# Patient Record
Sex: FEMALE | Race: WHITE | NOT HISPANIC OR LATINO | URBAN - METROPOLITAN AREA
[De-identification: names, ages, dates, MRNs, and addresses within clinical notes are randomized per-mention and may not be internally consistent; named-entity substitution may affect disease eponyms.]

---

## 2017-05-02 ENCOUNTER — APPOINTMENT (RX ONLY)
Dept: URBAN - METROPOLITAN AREA CLINIC 71 | Facility: CLINIC | Age: 72
Setting detail: DERMATOLOGY
End: 2017-05-02

## 2017-05-02 ENCOUNTER — APPOINTMENT (RX ONLY)
Dept: URBAN - METROPOLITAN AREA CLINIC 70 | Facility: CLINIC | Age: 72
Setting detail: DERMATOLOGY
End: 2017-05-02

## 2017-05-02 DIAGNOSIS — L29.89 OTHER PRURITUS: ICD-10-CM | Status: STABLE

## 2017-05-02 DIAGNOSIS — L29.8 OTHER PRURITUS: ICD-10-CM | Status: STABLE

## 2017-05-02 DIAGNOSIS — L56.8 OTHER SPECIFIED ACUTE SKIN CHANGES DUE TO ULTRAVIOLET RADIATION: ICD-10-CM

## 2017-05-02 DIAGNOSIS — L50.8 OTHER URTICARIA: ICD-10-CM

## 2017-05-02 DIAGNOSIS — L21.8 OTHER SEBORRHEIC DERMATITIS: ICD-10-CM

## 2017-05-02 DIAGNOSIS — Z71.89 OTHER SPECIFIED COUNSELING: ICD-10-CM

## 2017-05-02 PROCEDURE — 99214 OFFICE O/P EST MOD 30 MIN: CPT

## 2017-05-02 PROCEDURE — ? COUNSELING

## 2017-05-02 PROCEDURE — ? TREATMENT REGIMEN

## 2017-05-02 PROCEDURE — ? PRESCRIPTION

## 2017-05-02 RX ORDER — FLUOCINONIDE 0.5 MG/ML
OINTMENT TOPICAL
Qty: 1 | Refills: 0 | Status: ERX | COMMUNITY
Start: 2017-05-02

## 2017-05-02 RX ORDER — TRIAMCINOLONE ACETONIDE 1 MG/G
OINTMENT TOPICAL
Qty: 1 | Refills: 0 | Status: ERX

## 2017-05-02 RX ADMIN — FLUOCINONIDE: 0.5 OINTMENT TOPICAL at 00:00

## 2017-05-02 ASSESSMENT — LOCATION ZONE DERM
LOCATION ZONE: FACE
LOCATION ZONE: HAND
LOCATION ZONE: HAND
LOCATION ZONE: FACE
LOCATION ZONE: TRUNK
LOCATION ZONE: ARM
LOCATION ZONE: TRUNK
LOCATION ZONE: ARM

## 2017-05-02 ASSESSMENT — LOCATION DETAILED DESCRIPTION DERM
LOCATION DETAILED: LEFT MEDIAL BREAST 11-12:00 REGION
LOCATION DETAILED: RIGHT INFERIOR MEDIAL FOREHEAD
LOCATION DETAILED: INFERIOR THORACIC SPINE
LOCATION DETAILED: LEFT INFERIOR MEDIAL FOREHEAD
LOCATION DETAILED: LEFT INFERIOR MEDIAL FOREHEAD
LOCATION DETAILED: LEFT MEDIAL BREAST 11-12:00 REGION
LOCATION DETAILED: RIGHT POSTERIOR SHOULDER
LOCATION DETAILED: LEFT MID-UPPER BACK
LOCATION DETAILED: LEFT LATERAL SUPERIOR CHEST
LOCATION DETAILED: RIGHT RADIAL DORSAL HAND
LOCATION DETAILED: LEFT ULNAR DORSAL HAND
LOCATION DETAILED: LEFT SUPERIOR MEDIAL BUCCAL CHEEK
LOCATION DETAILED: LEFT CENTRAL MALAR CHEEK
LOCATION DETAILED: LEFT SUPERIOR MEDIAL BUCCAL CHEEK
LOCATION DETAILED: RIGHT INFERIOR MEDIAL FOREHEAD
LOCATION DETAILED: INFERIOR THORACIC SPINE
LOCATION DETAILED: RIGHT RADIAL DORSAL HAND
LOCATION DETAILED: LEFT CENTRAL MALAR CHEEK
LOCATION DETAILED: LEFT LATERAL SUPERIOR CHEST
LOCATION DETAILED: LEFT MID-UPPER BACK
LOCATION DETAILED: RIGHT POSTERIOR SHOULDER
LOCATION DETAILED: LEFT ULNAR DORSAL HAND

## 2017-05-02 ASSESSMENT — LOCATION SIMPLE DESCRIPTION DERM
LOCATION SIMPLE: LEFT CHEEK
LOCATION SIMPLE: RIGHT HAND
LOCATION SIMPLE: LEFT UPPER BACK
LOCATION SIMPLE: RIGHT HAND
LOCATION SIMPLE: CHEST
LOCATION SIMPLE: LEFT FOREHEAD
LOCATION SIMPLE: LEFT HAND
LOCATION SIMPLE: LEFT UPPER BACK
LOCATION SIMPLE: RIGHT SHOULDER
LOCATION SIMPLE: LEFT BREAST
LOCATION SIMPLE: RIGHT SHOULDER
LOCATION SIMPLE: RIGHT FOREHEAD
LOCATION SIMPLE: LEFT FOREHEAD
LOCATION SIMPLE: LEFT CHEEK
LOCATION SIMPLE: CHEST
LOCATION SIMPLE: LEFT HAND
LOCATION SIMPLE: RIGHT FOREHEAD
LOCATION SIMPLE: UPPER BACK
LOCATION SIMPLE: UPPER BACK
LOCATION SIMPLE: LEFT BREAST

## 2017-05-02 NOTE — PROCEDURE: TREATMENT REGIMEN
Plan: Cont. plaquenil and f/u with rheumatology for continued evaluation for possible lupus. h/o +RENEE 1:360\\nImproved on plaquenil
Detail Level: Generalized
Detail Level: Zone
Continue Regimen: TAC BID for flares\\nFluocinonide oint  bid
Initiate Treatment: SDF BID until resolved, then use prn for flares
Plan: Based on today's evaluation with pt, we recommended that she see Dr. Chadwick sooner than her scheduled Nov 2017 appointment

## 2017-05-02 NOTE — PROCEDURE: TREATMENT REGIMEN
Initiate Treatment: SDF BID until resolved, then use prn for flares
Detail Level: Zone
Continue Regimen: TAC BID for flares\\nFluocinonide oint  bid
Plan: Based on today's evaluation with pt, we recommended that she see Dr. Dutta sooner than her scheduled Nov 2017 appointment
Detail Level: Generalized
Plan: Cont. plaquenil and f/u with rheumatology for continued evaluation for possible lupus. h/o +KATHRYN 1:360\\nImproved on plaquenil

## 2017-09-14 ENCOUNTER — HOSPITAL ENCOUNTER (OUTPATIENT)
Dept: HOSPITAL 35 - PAIN | Age: 72
End: 2017-09-14
Attending: ANESTHESIOLOGY
Payer: COMMERCIAL

## 2017-09-14 VITALS — WEIGHT: 122 LBS | HEIGHT: 62.99 IN | BODY MASS INDEX: 21.62 KG/M2

## 2017-09-14 VITALS — DIASTOLIC BLOOD PRESSURE: 69 MMHG | SYSTOLIC BLOOD PRESSURE: 124 MMHG

## 2017-09-14 DIAGNOSIS — M54.16: Primary | ICD-10-CM

## 2018-03-21 ENCOUNTER — HOSPITAL ENCOUNTER (OUTPATIENT)
Dept: HOSPITAL 61 - PCVCCLINIC | Age: 73
Discharge: HOME | End: 2018-03-21
Attending: INTERNAL MEDICINE
Payer: MEDICARE

## 2018-03-21 DIAGNOSIS — I73.00: ICD-10-CM

## 2018-03-21 DIAGNOSIS — Z79.899: ICD-10-CM

## 2018-03-21 DIAGNOSIS — I73.9: ICD-10-CM

## 2018-03-21 DIAGNOSIS — E78.00: ICD-10-CM

## 2018-03-21 DIAGNOSIS — I10: ICD-10-CM

## 2018-03-21 DIAGNOSIS — Z87.891: ICD-10-CM

## 2018-03-21 DIAGNOSIS — I25.10: Primary | ICD-10-CM

## 2018-03-21 PROCEDURE — 93005 ELECTROCARDIOGRAM TRACING: CPT

## 2018-10-29 ENCOUNTER — HOSPITAL ENCOUNTER (OUTPATIENT)
Dept: HOSPITAL 61 - PCVCCLINIC | Age: 73
Discharge: HOME | End: 2018-10-29
Attending: INTERNAL MEDICINE
Payer: MEDICARE

## 2018-10-29 DIAGNOSIS — I73.9: ICD-10-CM

## 2018-10-29 DIAGNOSIS — I25.10: Primary | ICD-10-CM

## 2018-10-29 DIAGNOSIS — E78.5: ICD-10-CM

## 2018-10-29 DIAGNOSIS — I73.00: ICD-10-CM

## 2018-10-29 DIAGNOSIS — Z79.82: ICD-10-CM

## 2018-10-29 DIAGNOSIS — Z87.891: ICD-10-CM

## 2018-10-29 DIAGNOSIS — I65.23: ICD-10-CM

## 2018-10-29 DIAGNOSIS — I10: ICD-10-CM

## 2018-10-29 PROCEDURE — 93005 ELECTROCARDIOGRAM TRACING: CPT

## 2018-10-29 PROCEDURE — G0463 HOSPITAL OUTPT CLINIC VISIT: HCPCS

## 2019-05-09 ENCOUNTER — HOSPITAL ENCOUNTER (OUTPATIENT)
Dept: HOSPITAL 61 - PCVCIMAG | Age: 74
Discharge: HOME | End: 2019-05-09
Attending: INTERNAL MEDICINE
Payer: MEDICARE

## 2019-05-09 DIAGNOSIS — I25.10: ICD-10-CM

## 2019-05-09 DIAGNOSIS — I10: ICD-10-CM

## 2019-05-09 DIAGNOSIS — I73.9: Primary | ICD-10-CM

## 2019-05-09 DIAGNOSIS — I73.00: ICD-10-CM

## 2019-05-09 DIAGNOSIS — E78.5: ICD-10-CM

## 2019-05-09 PROCEDURE — 93978 VASCULAR STUDY: CPT

## 2019-05-09 NOTE — PCVCIMAG
EXAM: AORTOILIAC DUPLEX



INDICATION: Peripheral arterial disease 



FINDINGS: 



AORTA: Suprarenal aorta measures maximum diameter of 2.4 cm. There is

not a fusiform infrarenal aortic aneurysm. The infrarenal aorta

measures maximum diameter of 1.8 cm. No aortic stenosis.



RIGHT COMMON ILIAC ARTERY: Maximum diameter is 1.1 cm. No significant

stenosis.



RIGHT EXTERNAL ILIAC ARTERY:  No significant stenosis.



LEFT COMMON ILIAC ARTERY: Maximum diameter is 0.7 cm.  No significant

stenosis.



LEFT EXTERNAL ILIAC ARTERY:  No significant stenosis.



IMPRESSION: 

No abdominal aortic aneurysm. No aortoiliac stenosis seen.

Previous bilateral iliac stents maintaining satisfactory patency.





LOC:[id number]

## 2019-08-12 ENCOUNTER — HOSPITAL ENCOUNTER (OUTPATIENT)
Dept: HOSPITAL 61 - PCVCIMAG | Age: 74
Discharge: HOME | End: 2019-08-12
Attending: INTERNAL MEDICINE
Payer: MEDICARE

## 2019-08-12 DIAGNOSIS — I73.00: ICD-10-CM

## 2019-08-12 DIAGNOSIS — Z79.82: ICD-10-CM

## 2019-08-12 DIAGNOSIS — I10: ICD-10-CM

## 2019-08-12 DIAGNOSIS — Z79.899: ICD-10-CM

## 2019-08-12 DIAGNOSIS — I65.23: Primary | ICD-10-CM

## 2019-08-12 DIAGNOSIS — Z87.891: ICD-10-CM

## 2019-08-12 DIAGNOSIS — I25.10: ICD-10-CM

## 2019-08-12 DIAGNOSIS — Z88.8: ICD-10-CM

## 2019-08-12 DIAGNOSIS — M19.90: ICD-10-CM

## 2019-08-12 DIAGNOSIS — I73.9: ICD-10-CM

## 2019-08-12 DIAGNOSIS — E78.00: ICD-10-CM

## 2019-08-12 DIAGNOSIS — E78.5: ICD-10-CM

## 2019-08-12 PROCEDURE — 93880 EXTRACRANIAL BILAT STUDY: CPT

## 2019-08-12 PROCEDURE — 93005 ELECTROCARDIOGRAM TRACING: CPT

## 2019-08-12 PROCEDURE — 93925 LOWER EXTREMITY STUDY: CPT

## 2019-08-12 PROCEDURE — G0463 HOSPITAL OUTPT CLINIC VISIT: HCPCS

## 2019-08-12 NOTE — PCVCIMAG
EXAM: BILATERAL LOWER EXTREMITY ARTERIAL DUPLEX



INDICATION: Peripheral Arterial Disease. Leg pain.



FINDINGS: 

Right Leg: Satisfactory arterial waveforms throughout the

common/profunda/superficial femoral, popliteal, anterior tibial,

peroneal, and posterior tibial arteries. No flow limiting stenosis

seen.    



Left Leg:  Satisfactory arterial waveforms throughout the

common/profunda/superficial femoral, popliteal, anterior tibial,

peroneal, and posterior tibial arteries. No flow limiting stenosis

seen.    



IMPRESSION: 

No flow limiting stenosis in the right lower extremity.

No flow limiting stenosis in the left lower extremity.

   



LOC:[id number]

## 2019-08-12 NOTE — PCVCIMAG
--------------- APPROVED REPORT 

--------------





Indications

Stenosis



Risk Factors

Hypertension: 

Hyperlipidemia 



Surgery/Intervention

Endarterectomy: left   



Doppler Spectral Velocity Analysis

PSV  /  EDVPSV  /  EDV

ECA (R)     98 / 10 cm/sECA (L)     216 / 7 cm/s



dICA (R)    61 / 16 cm/sdICA (L)     83 / 22 cm/s

Arleth (R)     77 / 19 cm/smICA (L)     96 / 25 cm/s

pICA (R)     61 / 11 cm/spICA (L)     98 / 20 cm/s



Bulb (R)        69 / 14 cm/sBulb (L)         73 / 13 cm/s



dCCA (R)     89 / 17 cm/sdCCA (L)     94 / 22 cm/s

mCCA (R)    89 / 17 cm/smCCA (L)    87 / 16 cm/s



Vert (R)     71 / 8 cm/sVert (L)     56 / 8 cm/s

ICA/CCA     0.87ICA/CCA     1.04



Basic Measurements

Blood Pressure:                                                 

Pulses:                       

                                Right           Left               

             RightLeft

Brachial(Sitting)               96/86dkZr96/60mmHgTemporal       





Real Time B-Mode Imaging

Vert. (R)AntegradeVert. (L)Antegrade



Findings

The right carotid bulb has moderate calcified plaque.

The right proximal internal carotid artery shows <40% 

stenosis.

The right common carotid artery shows no significant stenosis.

The right external carotid artery shows no significant stenosis.

The left carotid bulb has mild  plaque.

The left proximal internal carotid artery shows <40% stenosis.

The left common carotid artery shows no significant stenosis.

The left external carotid artery shows >50% 

stenosis.



Conclusion

1. Right internal carotid artery stenosis (<40%)

2.  Left internal caroitid artery stenosis (<40%); prior CEA

3.  Antegrade vertebral flow

## 2020-08-14 ENCOUNTER — HOSPITAL ENCOUNTER (OUTPATIENT)
Dept: HOSPITAL 35 - SJCVCIMAG | Age: 75
End: 2020-08-14
Attending: INTERNAL MEDICINE
Payer: COMMERCIAL

## 2020-08-14 DIAGNOSIS — I70.8: ICD-10-CM

## 2020-08-14 DIAGNOSIS — M79.604: ICD-10-CM

## 2020-08-14 DIAGNOSIS — I65.23: Primary | ICD-10-CM

## 2020-08-27 ENCOUNTER — HOSPITAL ENCOUNTER (OUTPATIENT)
Dept: HOSPITAL 35 - SJCVC | Age: 75
End: 2020-08-27
Attending: INTERNAL MEDICINE
Payer: COMMERCIAL

## 2020-08-27 DIAGNOSIS — I73.00: ICD-10-CM

## 2020-08-27 DIAGNOSIS — E78.5: ICD-10-CM

## 2020-08-27 DIAGNOSIS — I10: ICD-10-CM

## 2020-08-27 DIAGNOSIS — Z87.891: ICD-10-CM

## 2020-08-27 DIAGNOSIS — I25.10: Primary | ICD-10-CM

## 2020-08-27 DIAGNOSIS — I65.23: ICD-10-CM

## 2020-08-27 DIAGNOSIS — I73.9: ICD-10-CM

## 2020-08-27 DIAGNOSIS — Z79.899: ICD-10-CM

## 2020-08-27 DIAGNOSIS — I49.1: ICD-10-CM

## 2021-12-06 ENCOUNTER — HOSPITAL ENCOUNTER (OUTPATIENT)
Dept: HOSPITAL 35 - SJCVCIMAG | Age: 76
End: 2021-12-06
Attending: INTERNAL MEDICINE
Payer: COMMERCIAL

## 2021-12-06 DIAGNOSIS — R94.31: Primary | ICD-10-CM

## 2021-12-06 DIAGNOSIS — Z79.899: ICD-10-CM

## 2021-12-06 DIAGNOSIS — I73.9: ICD-10-CM

## 2021-12-06 DIAGNOSIS — Z88.8: ICD-10-CM

## 2021-12-06 DIAGNOSIS — I73.00: ICD-10-CM

## 2021-12-06 DIAGNOSIS — I65.23: ICD-10-CM

## 2021-12-06 DIAGNOSIS — E07.9: ICD-10-CM

## 2021-12-06 DIAGNOSIS — Z87.891: ICD-10-CM

## 2021-12-06 DIAGNOSIS — E78.5: ICD-10-CM

## 2021-12-06 DIAGNOSIS — I44.0: ICD-10-CM

## 2021-12-06 DIAGNOSIS — I25.10: ICD-10-CM

## 2021-12-06 DIAGNOSIS — E78.00: ICD-10-CM

## 2021-12-06 DIAGNOSIS — I10: ICD-10-CM

## 2021-12-06 DIAGNOSIS — I48.92: ICD-10-CM

## 2021-12-06 DIAGNOSIS — Z72.89: ICD-10-CM
